# Patient Record
Sex: FEMALE | HISPANIC OR LATINO
[De-identification: names, ages, dates, MRNs, and addresses within clinical notes are randomized per-mention and may not be internally consistent; named-entity substitution may affect disease eponyms.]

---

## 2021-11-19 ENCOUNTER — APPOINTMENT (OUTPATIENT)
Dept: UROGYNECOLOGY | Facility: CLINIC | Age: 61
End: 2021-11-19
Payer: MEDICAID

## 2021-11-19 VITALS
DIASTOLIC BLOOD PRESSURE: 85 MMHG | WEIGHT: 164 LBS | TEMPERATURE: 98.4 F | BODY MASS INDEX: 26.36 KG/M2 | OXYGEN SATURATION: 98 % | SYSTOLIC BLOOD PRESSURE: 127 MMHG | HEIGHT: 66 IN | HEART RATE: 71 BPM

## 2021-11-19 DIAGNOSIS — I10 ESSENTIAL (PRIMARY) HYPERTENSION: ICD-10-CM

## 2021-11-19 DIAGNOSIS — Z81.8 FAMILY HISTORY OF OTHER MENTAL AND BEHAVIORAL DISORDERS: ICD-10-CM

## 2021-11-19 DIAGNOSIS — N95.2 POSTMENOPAUSAL ATROPHIC VAGINITIS: ICD-10-CM

## 2021-11-19 DIAGNOSIS — Z83.42 FAMILY HISTORY OF FAMILIAL HYPERCHOLESTEROLEMIA: ICD-10-CM

## 2021-11-19 DIAGNOSIS — Z63.5 DISRUPTION OF FAMILY BY SEPARATION AND DIVORCE: ICD-10-CM

## 2021-11-19 DIAGNOSIS — Z82.49 FAMILY HISTORY OF ISCHEMIC HEART DISEASE AND OTHER DISEASES OF THE CIRCULATORY SYSTEM: ICD-10-CM

## 2021-11-19 DIAGNOSIS — E78.00 PURE HYPERCHOLESTEROLEMIA, UNSPECIFIED: ICD-10-CM

## 2021-11-19 PROBLEM — Z00.00 ENCOUNTER FOR PREVENTIVE HEALTH EXAMINATION: Status: ACTIVE | Noted: 2021-11-19

## 2021-11-19 LAB
BILIRUB UR QL STRIP: NORMAL
CLARITY UR: CLEAR
COLLECTION METHOD: NORMAL
GLUCOSE UR-MCNC: NORMAL
HCG UR QL: 0.2 EU/DL
HGB UR QL STRIP.AUTO: NORMAL
KETONES UR-MCNC: NORMAL
LEUKOCYTE ESTERASE UR QL STRIP: NORMAL
NITRITE UR QL STRIP: NORMAL
PH UR STRIP: 8.5
PROT UR STRIP-MCNC: NORMAL
SP GR UR STRIP: 1.02

## 2021-11-19 PROCEDURE — 51736 URINE FLOW MEASUREMENT: CPT

## 2021-11-19 PROCEDURE — 99204 OFFICE O/P NEW MOD 45 MIN: CPT | Mod: 25

## 2021-11-19 PROCEDURE — 51725 SIMPLE CYSTOMETROGRAM: CPT

## 2021-11-19 SDOH — SOCIAL STABILITY - SOCIAL INSECURITY: DISRUPTION OF FAMILY BY SEPARATION AND DIVORCE: Z63.5

## 2021-11-19 NOTE — HISTORY OF PRESENT ILLNESS
[FreeTextEntry1] : Phone dictation declined.  Romanian translation with med assistant Ludmila\par \par This patient presents with mixed urinary incontinence symptoms. She has frequency, urgency, and with urgency she is unable to suppress urinary leakage. \par \par She also describes stress urinary incontinence with coughing, sneezing. \par \par \par lEAK WITH INTERCOURSE\par \par hysterectomy for uretrine cancer.  no adjuvant therapy\par She is up at night             3-5 times\par \par Duration: Mild/moderate     1-3 years\par Severity:       severe\par \par Previous treatment: none\par \par Fluid habits:  diary note done --ASSIGNED\par \par On examination, the general physical examination is normal. External genitalia normal.\par \par Atrophy::  there is MODERATE evidence of genital atrophy\par \par Atrophy:  there are atrophic changes noted to be   moderate \par \par The vagina has no evidence of lesions or abnormal discharge. Bimanual is normal.\par \par Prolapse exam: The anterior vaginal wall is at   -2      the cervix is   ABSENT    in the posterior vaginal wall is at  -3\par \par Catheterized  PVR was performed with sterile technique to evaluate retention, hesitancy, UTI\par \par Urine Analysis demonstrated : PVR 40 CC    Negative urine\par \par DIAGNOSTIC PROCEDURE: SIMPLE CYSTOMETRY :\par  -diagnostic test indicated as general examination, history, urine analysis and  microscopy failed to explain patient complaints\par \par In the supine position, the urethra was prepped with Betadine. A 16 [place] catheter was gently passed into the bladder with sterile technique and secured in place.\par A urine sample was obtained via catheterization. Sterile water was infused by gravity via an irrigation syringe.\par Patient sensation, change in flow rate, and capacity were observed: \par \par First Sensation     150     (cc)      First Urgency   300     Increase [id number] - capacity \par  Pain with Fill:  NEGATIVE   \par Sensory Urgency:         moderate  \par Post Fill Void     complete (cc):                             \par \par Involuntary detrusor contractions:   favoring contractions present\par \par Cough Stress Test :     positive with reduc of perineum at capacity\par \par Diagnoses: \par \par  overactive bladder     mild  moderate  severe    equivocal,   sensory urgency\par  stress incontinence, sensory urgency,  \par \par \par \par Impression : Mixed incontinence \par \par Patient was educated regarding the difference between the 2 ties of incontinence. \par I have recommended dietary suggestions and hydration habits\par she requires formal urodynamics for triage.  Anticipate medical management of overactive bladder.  Sling if stress incont component persists as clinically bothersome voiding diary and return for formal urodynamics.\par \par \par

## 2021-11-22 PROBLEM — Z81.8 FAMILY HISTORY OF DEPRESSION: Status: ACTIVE | Noted: 2021-11-22

## 2021-11-22 PROBLEM — E78.00 HYPERCHOLESTEROLEMIA: Status: ACTIVE | Noted: 2021-11-22

## 2021-11-22 PROBLEM — Z63.5 DIVORCED: Status: ACTIVE | Noted: 2021-11-22

## 2021-11-22 PROBLEM — Z83.42 FAMILY HISTORY OF HYPERCHOLESTEROLEMIA: Status: ACTIVE | Noted: 2021-11-22

## 2021-11-22 PROBLEM — Z82.49 FAMILY HISTORY OF HYPERTENSION: Status: ACTIVE | Noted: 2021-11-22

## 2021-11-22 PROBLEM — I10 HYPERTENSION: Status: ACTIVE | Noted: 2021-11-22

## 2021-11-22 LAB
APPEARANCE: ABNORMAL
BACTERIA UR CULT: NORMAL
BACTERIA: NEGATIVE
BILIRUBIN URINE: NEGATIVE
BLOOD URINE: NEGATIVE
COLOR: YELLOW
GLUCOSE QUALITATIVE U: NEGATIVE
HYALINE CASTS: 0 /LPF
KETONES URINE: NEGATIVE
LEUKOCYTE ESTERASE URINE: NEGATIVE
MICROSCOPIC-UA: NORMAL
NITRITE URINE: NEGATIVE
PH URINE: 8
PROTEIN URINE: NEGATIVE
RED BLOOD CELLS URINE: 1 /HPF
SPECIFIC GRAVITY URINE: 1.02
SQUAMOUS EPITHELIAL CELLS: 0 /HPF
UROBILINOGEN URINE: NORMAL
WHITE BLOOD CELLS URINE: 0 /HPF

## 2021-11-22 RX ORDER — LISINOPRIL 30 MG/1
TABLET ORAL
Refills: 0 | Status: ACTIVE | COMMUNITY

## 2021-11-22 RX ORDER — ATORVASTATIN CALCIUM 80 MG/1
TABLET, FILM COATED ORAL
Refills: 0 | Status: ACTIVE | COMMUNITY

## 2021-11-22 RX ORDER — CHLORTHALIDONE 50 MG/1
TABLET ORAL
Refills: 0 | Status: ACTIVE | COMMUNITY

## 2021-12-21 ENCOUNTER — APPOINTMENT (OUTPATIENT)
Dept: UROLOGY | Facility: CLINIC | Age: 61
End: 2021-12-21

## 2022-01-07 ENCOUNTER — APPOINTMENT (OUTPATIENT)
Dept: UROGYNECOLOGY | Facility: CLINIC | Age: 62
End: 2022-01-07

## 2022-01-28 ENCOUNTER — APPOINTMENT (OUTPATIENT)
Dept: UROLOGY | Facility: CLINIC | Age: 62
End: 2022-01-28

## 2022-01-28 VITALS — TEMPERATURE: 91.8 F

## 2022-03-11 ENCOUNTER — APPOINTMENT (OUTPATIENT)
Dept: UROLOGY | Facility: CLINIC | Age: 62
End: 2022-03-11

## 2022-03-11 ENCOUNTER — APPOINTMENT (OUTPATIENT)
Dept: UROLOGY | Facility: CLINIC | Age: 62
End: 2022-03-11
Payer: MEDICAID

## 2022-03-11 DIAGNOSIS — R39.11 HESITANCY OF MICTURITION: ICD-10-CM

## 2022-03-11 DIAGNOSIS — N39.46 MIXED INCONTINENCE: ICD-10-CM

## 2022-03-11 DIAGNOSIS — R35.0 FREQUENCY OF MICTURITION: ICD-10-CM

## 2022-03-11 PROCEDURE — 51784 ANAL/URINARY MUSCLE STUDY: CPT | Mod: 26

## 2022-03-11 PROCEDURE — 51741 ELECTRO-UROFLOWMETRY FIRST: CPT | Mod: 26

## 2022-03-11 PROCEDURE — 51797 INTRAABDOMINAL PRESSURE TEST: CPT | Mod: 26

## 2022-03-11 PROCEDURE — 51728 CYSTOMETROGRAM W/VP: CPT | Mod: 26
